# Patient Record
Sex: FEMALE | Race: BLACK OR AFRICAN AMERICAN
[De-identification: names, ages, dates, MRNs, and addresses within clinical notes are randomized per-mention and may not be internally consistent; named-entity substitution may affect disease eponyms.]

---

## 2017-08-20 ENCOUNTER — HOSPITAL ENCOUNTER (EMERGENCY)
Dept: HOSPITAL 10 - FTE | Age: 37
Discharge: HOME | End: 2017-08-20
Payer: COMMERCIAL

## 2017-08-20 VITALS
WEIGHT: 117.95 LBS | HEIGHT: 60 IN | WEIGHT: 117.95 LBS | HEIGHT: 60 IN | BODY MASS INDEX: 23.16 KG/M2 | BODY MASS INDEX: 23.16 KG/M2

## 2017-08-20 DIAGNOSIS — R11.2: ICD-10-CM

## 2017-08-20 DIAGNOSIS — K29.70: Primary | ICD-10-CM

## 2017-08-20 LAB
ADD UMIC: YES
ALBUMIN SERPL-MCNC: 4.4 G/DL (ref 3.3–4.9)
ALBUMIN/GLOB SERPL: 1.41 {RATIO}
ALP SERPL-CCNC: 38 IU/L (ref 42–121)
ALT SERPL-CCNC: 25 IU/L (ref 13–69)
ANION GAP SERPL CALC-SCNC: 14 MMOL/L (ref 8–16)
AST SERPL-CCNC: 17 IU/L (ref 15–46)
BACTERIA #/AREA URNS HPF: (no result) /HPF
BASOPHILS # BLD AUTO: 0.1 10^3/UL (ref 0–0.1)
BASOPHILS NFR BLD: 0.6 % (ref 0–2)
BILIRUB DIRECT SERPL-MCNC: 0 MG/DL (ref 0–0.2)
BILIRUB SERPL-MCNC: 0.5 MG/DL (ref 0.2–1.3)
BUN SERPL-MCNC: 13 MG/DL (ref 7–20)
CALCIUM SERPL-MCNC: 9.8 MG/DL (ref 8.4–10.2)
CHLORIDE SERPL-SCNC: 100 MMOL/L (ref 97–110)
CO2 SERPL-SCNC: 29 MMOL/L (ref 21–31)
COLOR UR: YELLOW
CREAT SERPL-MCNC: 0.92 MG/DL (ref 0.44–1)
EOSINOPHIL # BLD: 0.1 10^3/UL (ref 0–0.5)
EOSINOPHIL NFR BLD: 1 % (ref 0–7)
ERYTHROCYTE [DISTWIDTH] IN BLOOD BY AUTOMATED COUNT: 14.6 % (ref 11.5–14.5)
GLOBULIN SER-MCNC: 3.1 G/DL (ref 1.3–3.2)
GLUCOSE SERPL-MCNC: 97 MG/DL (ref 70–220)
GLUCOSE UR STRIP-MCNC: NEGATIVE MG/DL
HCT VFR BLD CALC: 37.3 % (ref 37–47)
HGB BLD-MCNC: 12.8 G/DL (ref 12–16)
KETONES UR STRIP.AUTO-MCNC: NEGATIVE MG/DL
LYMPHOCYTES # BLD AUTO: 2.7 10^3/UL (ref 0.8–2.9)
LYMPHOCYTES NFR BLD AUTO: 32.1 % (ref 15–51)
MCH RBC QN AUTO: 26.7 PG (ref 29–33)
MCHC RBC AUTO-ENTMCNC: 34.3 G/DL (ref 32–37)
MCV RBC AUTO: 77.7 FL (ref 82–101)
MONOCYTES # BLD: 0.7 10^3/UL (ref 0.3–0.9)
MONOCYTES NFR BLD: 8.8 % (ref 0–11)
NEUTROPHILS NFR BLD AUTO: 57.3 % (ref 39–77)
NITRITE UR QL STRIP.AUTO: NEGATIVE MG/DL
NRBC # BLD MANUAL: 0 10^3/UL (ref 0–0)
NRBC BLD AUTO-RTO: 0 /100WBC (ref 0–0)
PLATELET # BLD: 335 10^3/UL (ref 140–415)
PMV BLD AUTO: 9.5 FL (ref 7.4–10.4)
POTASSIUM SERPL-SCNC: 3.8 MMOL/L (ref 3.5–5.1)
PROT SERPL-MCNC: 7.5 G/DL (ref 6.1–8.1)
RBC # BLD AUTO: 4.8 10^6/UL (ref 4.2–5.4)
RBC # UR AUTO: NEGATIVE MG/DL
SODIUM SERPL-SCNC: 139 MMOL/L (ref 135–144)
SQUAMOUS #/AREA URNS HPF: (no result) /HPF
UR AMORPHOUS CRYSTAL: (no result) /HPF
UR ASCORBIC ACID: 40 MG/DL
UR BILIRUBIN (DIP): NEGATIVE MG/DL
UR CLARITY: (no result)
UR PH (DIP): 7 (ref 5–9)
UR RBC: 3 /HPF (ref 0–5)
UR SPECIFIC GRAVITY (DIP): 1.02 (ref 1–1.03)
UR TOTAL PROTEIN (DIP): NEGATIVE MG/DL
UROBILINOGEN UR STRIP-ACNC: NEGATIVE MG/DL
WBC # BLD AUTO: 8.3 10^3/UL (ref 4.8–10.8)
WBC # UR STRIP: (no result) LEU/UL

## 2017-08-20 PROCEDURE — 80053 COMPREHEN METABOLIC PANEL: CPT

## 2017-08-20 PROCEDURE — 99283 EMERGENCY DEPT VISIT LOW MDM: CPT

## 2017-08-20 PROCEDURE — 36415 COLL VENOUS BLD VENIPUNCTURE: CPT

## 2017-08-20 PROCEDURE — 83690 ASSAY OF LIPASE: CPT

## 2017-08-20 PROCEDURE — 85025 COMPLETE CBC W/AUTO DIFF WBC: CPT

## 2017-08-20 PROCEDURE — 81001 URINALYSIS AUTO W/SCOPE: CPT

## 2017-08-20 NOTE — ERD
ER Documentation


Chief Complaint


Date/Time


DATE: 8/20/17 


TIME: 13:16


Chief Complaint


Complains of abdominal pain x 1 week





HPI


This is a 36-year-old female who is complaining of epigastric pain that she has 

had on and off for 1 week.  She states sometimes she eats it makes it worse but 

sometimes she eats and makes it better.  She admits to episodes of nausea and 

vomiting but she is tolerating oral intake.  She has tried Pepto-Bismol and [place]

[place] without any relief.  She denies any fever.  She denies any lower 

abdominal or pelvic pain.  She denies any dysuria hematuria or urinary 

frequency.  Last menstrual period was approximately 1 week ago.





ROS


All systems reviewed and are negative except as per history of present illness.





Medications


Home Meds


Active Scripts


Ondansetron (Ondansetron Odt) 4 Mg Tab.rapdis, 4 MG PO Q6H Y for NAUSEA AND/OR 

VOMITING, #20 TAB


   Prov:IRA OSORIO PA-C         8/20/17


Famotidine* (Famotidine*) 40 Mg Tablet, 40 MG PO BID, #60 TAB


   Prov:IRA OSORIO PA-C         8/20/17





Allergies


Allergies:  


Coded Allergies:  


     No Known Allergy (Unverified , 8/20/17)





PMhx/Soc


Medical and Surgical Hx:  pt denies Medical Hx, pt denies Surgical Hx


Hx Alcohol Use:  Yes


Hx Substance Use:  No


Hx Tobacco Use:  No


Smoking Status:  Never smoker





FmHx


Family History:  No diabetes





Physical Exam


Vitals





Vital Signs








  Date Time  Temp Pulse Resp B/P Pulse Ox O2 Delivery O2 Flow Rate FiO2


 


8/20/17 12:01 98.4 76 20 104/51 100   








Physical Exam


INITIAL VITAL SIGNS: Reviewed by me


GENERAL: Awake, alert and oriented x 4, well appearing, nontoxic, speaking in 

full sentences. No acute distress


HEAD: Atraumatic


EYES: EOMI. PERRL. 


 


THROAT: No tonilar erythema or edema. No exudates. Uvula midline. No kissing 

tonsils.


NECK: Supple. No masses. Full range of motion.  No meningismus. No midline 

tenderness.


RESPIRATORY: Clear to auscultation bilaterally. Symmetric chest wall rise.  No 

wheezing or rales. No accessory muscle use.  


CV: Regular rate and rhythm. No murmurs, rubs, or gallops.  


ABDOMEN: Soft, non-distended. Nontender. Negative [place]. Negative McBurneys 

point tenderness. No CVA tenderness bilaterally. No guarding. No rebound. 


: Deffered.


Result Diagram:  


8/20/17 1226                                                                   

             8/20/17 1226





Results 24 hrs





 Laboratory Tests








Test


  8/20/17


12:26


 


White Blood Count 8.310^3/ul 


 


Red Blood Count 4.8010^6/ul 


 


Hemoglobin 12.8g/dl 


 


Hematocrit 37.3% 


 


Mean Corpuscular Volume 77.7fl 


 


Mean Corpuscular Hemoglobin 26.7pg 


 


Mean Corpuscular Hemoglobin


Concent 34.3g/dl 


 


 


Red Cell Distribution Width 14.6% 


 


Platelet Count 19516^3/UL 


 


Mean Platelet Volume 9.5fl 


 


Neutrophils % 57.3% 


 


Lymphocytes % 32.1% 


 


Monocytes % 8.8% 


 


Eosinophils % 1.0% 


 


Basophils % 0.6% 


 


Nucleated Red Blood Cells % 0.0/100WBC 


 


Neutrophils # (Manual) 510^3/ul 


 


Lymphocytes # 2.710^3/ul 


 


Monocytes # 0.710^3/ul 


 


Eosinophils # 0.110^3/ul 


 


Basophils # 0.110^3/ul 


 


Nucleated Red Blood Cells # 0.010^3/ul 


 


Urine Color YELLOW 


 


Urine Clarity CLOUDY 


 


Urine pH 7.0 


 


Urine Specific Gravity 1.020 


 


Urine Ketones NEGATIVEmg/dL 


 


Urine Nitrite NEGATIVEmg/dL 


 


Urine Bilirubin NEGATIVEmg/dL 


 


Urine Urobilinogen NEGATIVEmg/dL 


 


Urine Leukocyte Esterase TRACELeu/ul 


 


Urine Microscopic RBC 3/HPF 


 


Urine Microscopic WBC 10/HPF 


 


Urine Squamous Epithelial


Cells FEW/HPF 


 


 


Urine Amorphous Crystals MANY/HPF 


 


Urine Bacteria FEW/HPF 


 


Urine Hemoglobin NEGATIVEmg/dL 


 


Urine Glucose NEGATIVEmg/dL 


 


Urine Total Protein NEGATIVEmg/dl 


 


Sodium Level 139mmol/L 


 


Potassium Level 3.8mmol/L 


 


Chloride Level 100mmol/L 


 


Carbon Dioxide Level 29mmol/L 


 


Anion Gap 14 


 


Blood Urea Nitrogen 13mg/dl 


 


Creatinine 0.92mg/dl 


 


Glucose Level 97mg/dl 


 


Calcium Level 9.8mg/dl 


 


Total Bilirubin 0.5mg/dl 


 


Direct Bilirubin 0.00mg/dl 


 


Indirect Bilirubin 0.5mg/dl 


 


Aspartate Amino Transf


(AST/SGOT) 17IU/L 


 


 


Alanine Aminotransferase


(ALT/SGPT) 25IU/L 


 


 


Alkaline Phosphatase 38IU/L 


 


Total Protein 7.5g/dl 


 


Albumin 4.4g/dl 


 


Globulin 3.10g/dl 


 


Albumin/Globulin Ratio 1.41 


 


Lipase 58U/L 








 Current Medications








 Medications


  (Trade)  Dose


 Ordered  Sig/Rex


 Route


 PRN Reason  Start Time


 Stop Time Status Last Admin


Dose Admin


 


 Miscellaneous


 Medication


  (Gi Cocktail (2))  40 ml  ONCE  STAT


 PO


   8/20/17 12:14


 8/20/17 12:16 DC 8/20/17 12:28


 


 


 [place]/


 Phenobarbital


  (Donnatal)  2 tab  ONCE  STAT


 PO


   8/20/17 12:14


 8/20/17 12:16 DC 8/20/17 12:28


 


 


 Ondansetron HCl


  (Zofran Odt)  4 mg  ONCE  STAT


 ODT


   8/20/17 12:30


 8/20/17 12:31 DC 8/20/17 12:32


 


 


 Ondansetron HCl


  (Zofran Odt)  4 mg  STK-MED ONCE


 ODT


   8/20/17 12:31


 8/20/17 12:32 DC  


 











Procedures/MDM


36-year-old presents with abdominal pain.  Patients is alert, oriented, well 

appearing, and in no distress with normal vital signs. There is no fever, 

tachycardia, or tachypnea.  Laboratory analysis shows no evidence of acute 

emergent abnormality. No evidence of significant leukocytosis suggesting 

systemic infection or severe anemia. No evidence of acute renal or liver failure

, no evidence of severe alkalosis or acidosis. The differential diagnosis 

includes but is not limited to appendicitis, cholelithiasis, cholecystitis, 

pancreatitis, hepatitis, gastritis, peptic ulcer disease, bowel obstruction, 

diverticulitis, renal disease including stones, torsion, AAA, pyelonephritis, 

and others.  Patient had good relief of her symptoms with GI cocktail.  She was 

discharged with Pepcid and Zofran as well as copies of labs she can follow with 

primary care.  Patient counseled regarding my diagnostic impression and care 

plan. Prior to discharge all questions answered. Pt agrees with treatment plan 

and understands strict return precautions. Pt is instructed to follow up with 

primary care provider within 24-48 hours. Precautionary instructions provided 

including instructions to return to the ER if not improving or for any 

worsening or changing symptoms or concerns.





Departure


Diagnosis:  


 Primary Impression:  


 Gastritis


Condition:  Stable


Patient Instructions:  Gastritis (Adult)





Additional Instructions:  


Call your primary care doctor TOMORROW for an appointment during the next 1-2 

days.See the doctor sooner or return here if your condition worsens before your 

appointment time.











IRA OSORIO PA-C Aug 20, 2017 13:19

## 2018-10-22 ENCOUNTER — HOSPITAL ENCOUNTER (EMERGENCY)
Dept: HOSPITAL 91 - FTE | Age: 38
Discharge: HOME | End: 2018-10-22
Payer: COMMERCIAL

## 2018-10-22 ENCOUNTER — HOSPITAL ENCOUNTER (EMERGENCY)
Age: 38
Discharge: HOME | End: 2018-10-22

## 2018-10-22 DIAGNOSIS — R05: Primary | ICD-10-CM

## 2018-10-22 PROCEDURE — 99283 EMERGENCY DEPT VISIT LOW MDM: CPT

## 2018-10-22 PROCEDURE — 71045 X-RAY EXAM CHEST 1 VIEW: CPT

## 2018-10-22 PROCEDURE — 94644 CONT INHLJ TX 1ST HOUR: CPT

## 2018-10-22 RX ADMIN — IPRATROPIUM BROMIDE 1 MG: 0.5 SOLUTION RESPIRATORY (INHALATION) at 13:41

## 2018-10-22 RX ADMIN — ALBUTEROL SULFATE 1 MG: 2.5 SOLUTION RESPIRATORY (INHALATION) at 13:41

## 2019-01-24 ENCOUNTER — HOSPITAL ENCOUNTER (EMERGENCY)
Dept: HOSPITAL 91 - FTE | Age: 39
Discharge: HOME | End: 2019-01-24
Payer: COMMERCIAL

## 2019-01-24 DIAGNOSIS — R11.0: ICD-10-CM

## 2019-01-24 DIAGNOSIS — R10.10: Primary | ICD-10-CM

## 2019-01-24 LAB
ADD MAN DIFF?: NO
ADD UMIC: NO
ALANINE AMINOTRANSFERASE: 21 IU/L (ref 13–69)
ALBUMIN/GLOBULIN RATIO: 1.45
ALBUMIN: 4.8 G/DL (ref 3.3–4.9)
ALKALINE PHOSPHATASE: 45 IU/L (ref 42–121)
ANION GAP: 10 (ref 5–13)
ASPARTATE AMINO TRANSFERASE: 20 IU/L (ref 15–46)
BASOPHIL #: 0.1 10^3/UL (ref 0–0.1)
BASOPHILS %: 0.7 % (ref 0–2)
BILIRUBIN,DIRECT: 0 MG/DL (ref 0–0.2)
BILIRUBIN,TOTAL: 0.3 MG/DL (ref 0.2–1.3)
BLOOD UREA NITROGEN: 12 MG/DL (ref 7–20)
CALCIUM: 10.2 MG/DL (ref 8.4–10.2)
CARBON DIOXIDE: 29 MMOL/L (ref 21–31)
CHLORIDE: 101 MMOL/L (ref 97–110)
CREATININE: 0.84 MG/DL (ref 0.44–1)
EOSINOPHILS #: 0.6 10^3/UL (ref 0–0.5)
EOSINOPHILS %: 5.1 % (ref 0–7)
GLOBULIN: 3.3 G/DL (ref 1.3–3.2)
GLUCOSE: 94 MG/DL (ref 70–220)
HEMATOCRIT: 38.7 % (ref 37–47)
HEMOGLOBIN: 13.3 G/DL (ref 12–16)
IMMATURE GRANS #M: 0.04 10^3/UL (ref 0–0.03)
IMMATURE GRANS % (M): 0.3 % (ref 0–0.43)
LIPASE: 101 U/L (ref 23–300)
LYMPHOCYTES #: 3 10^3/UL (ref 0.8–2.9)
LYMPHOCYTES %: 24.8 % (ref 15–51)
MEAN CORPUSCULAR HEMOGLOBIN: 26.3 PG (ref 29–33)
MEAN CORPUSCULAR HGB CONC: 34.4 G/DL (ref 32–37)
MEAN CORPUSCULAR VOLUME: 76.5 FL (ref 82–101)
MEAN PLATELET VOLUME: 9.7 FL (ref 7.4–10.4)
MONOCYTE #: 0.5 10^3/UL (ref 0.3–0.9)
MONOCYTES %: 4.4 % (ref 0–11)
NEUTROPHIL #: 7.8 10^3/UL (ref 1.6–7.5)
NEUTROPHILS %: 64.7 % (ref 39–77)
NUCLEATED RED BLOOD CELLS #: 0 10^3/UL (ref 0–0)
NUCLEATED RED BLOOD CELLS%: 0 /100WBC (ref 0–0)
PLATELET COUNT: 340 10^3/UL (ref 140–415)
POTASSIUM: 4.6 MMOL/L (ref 3.5–5.1)
RED BLOOD COUNT: 5.06 10^6/UL (ref 4.2–5.4)
RED CELL DISTRIBUTION WIDTH: 14.5 % (ref 11.5–14.5)
SODIUM: 140 MMOL/L (ref 135–144)
TOTAL PROTEIN: 8.1 G/DL (ref 6.1–8.1)
UR AMORPHOUS CRYSTAL: (no result) /HPF
UR ASCORBIC ACID: NEGATIVE MG/DL
UR BILIRUBIN (DIP): NEGATIVE MG/DL
UR BLOOD (DIP): NEGATIVE MG/DL
UR CLARITY: (no result)
UR COLOR: (no result)
UR GLUCOSE (DIP): NEGATIVE MG/DL
UR KETONES (DIP): NEGATIVE MG/DL
UR LEUKOCYTE ESTERASE (DIP): NEGATIVE LEU/UL
UR NITRITE (DIP): NEGATIVE MG/DL
UR PH (DIP): 7 (ref 5–9)
UR RBC: 0 /HPF (ref 0–5)
UR SPECIFIC GRAVITY (DIP): 1.01 (ref 1–1.03)
UR SQUAMOUS EPITHELIAL CELL: (no result) /HPF
UR TOTAL PROTEIN (DIP): NEGATIVE MG/DL
UR UROBILINOGEN (DIP): NEGATIVE MG/DL
UR WBC: 1 /HPF (ref 0–5)
WHITE BLOOD COUNT: 12 10^3/UL (ref 4.8–10.8)

## 2019-01-24 PROCEDURE — 76705 ECHO EXAM OF ABDOMEN: CPT

## 2019-01-24 PROCEDURE — 81003 URINALYSIS AUTO W/O SCOPE: CPT

## 2019-01-24 PROCEDURE — 36415 COLL VENOUS BLD VENIPUNCTURE: CPT

## 2019-01-24 PROCEDURE — 96375 TX/PRO/DX INJ NEW DRUG ADDON: CPT

## 2019-01-24 PROCEDURE — 81001 URINALYSIS AUTO W/SCOPE: CPT

## 2019-01-24 PROCEDURE — 96361 HYDRATE IV INFUSION ADD-ON: CPT

## 2019-01-24 PROCEDURE — 80053 COMPREHEN METABOLIC PANEL: CPT

## 2019-01-24 PROCEDURE — 81025 URINE PREGNANCY TEST: CPT

## 2019-01-24 PROCEDURE — 96374 THER/PROPH/DIAG INJ IV PUSH: CPT

## 2019-01-24 PROCEDURE — 99285 EMERGENCY DEPT VISIT HI MDM: CPT

## 2019-01-24 PROCEDURE — 74176 CT ABD & PELVIS W/O CONTRAST: CPT

## 2019-01-24 PROCEDURE — 83690 ASSAY OF LIPASE: CPT

## 2019-01-24 PROCEDURE — 85025 COMPLETE CBC W/AUTO DIFF WBC: CPT

## 2019-01-24 RX ADMIN — FAMOTIDINE 1 MG: 10 INJECTION, SOLUTION INTRAVENOUS at 15:07

## 2019-01-24 RX ADMIN — FAMOTIDINE 1 MG: 10 INJECTION, SOLUTION INTRAVENOUS at 17:53

## 2019-01-24 RX ADMIN — ACETAMINOPHEN 1 MG: 325 TABLET, FILM COATED ORAL at 17:05

## 2019-01-24 RX ADMIN — THIAMINE HYDROCHLORIDE 1 MLS/HR: 100 INJECTION, SOLUTION INTRAMUSCULAR; INTRAVENOUS at 15:08

## 2019-01-24 RX ADMIN — ONDANSETRON HYDROCHLORIDE 1 MG: 2 INJECTION, SOLUTION INTRAMUSCULAR; INTRAVENOUS at 15:07

## 2019-01-25 ENCOUNTER — HOSPITAL ENCOUNTER (EMERGENCY)
Dept: HOSPITAL 10 - FTE | Age: 39
Discharge: HOME | End: 2019-01-25
Payer: COMMERCIAL

## 2019-01-25 ENCOUNTER — HOSPITAL ENCOUNTER (EMERGENCY)
Dept: HOSPITAL 91 - FTE | Age: 39
Discharge: HOME | End: 2019-01-25
Payer: COMMERCIAL

## 2019-01-25 VITALS — BODY MASS INDEX: 26.53 KG/M2 | WEIGHT: 114.64 LBS | HEIGHT: 55 IN

## 2019-01-25 DIAGNOSIS — R10.9: Primary | ICD-10-CM

## 2019-01-25 LAB
ADD MAN DIFF?: NO
ALANINE AMINOTRANSFERASE: 26 IU/L (ref 13–69)
ALBUMIN/GLOBULIN RATIO: 1.36
ALBUMIN: 4.5 G/DL (ref 3.3–4.9)
ALKALINE PHOSPHATASE: 38 IU/L (ref 42–121)
ANION GAP: 14 (ref 5–13)
ASPARTATE AMINO TRANSFERASE: 18 IU/L (ref 15–46)
BASOPHIL #: 0.1 10^3/UL (ref 0–0.1)
BASOPHILS %: 0.9 % (ref 0–2)
BILIRUBIN,DIRECT: 0 MG/DL (ref 0–0.2)
BILIRUBIN,TOTAL: 0.2 MG/DL (ref 0.2–1.3)
BLOOD UREA NITROGEN: 11 MG/DL (ref 7–20)
CALCIUM: 10.2 MG/DL (ref 8.4–10.2)
CARBON DIOXIDE: 28 MMOL/L (ref 21–31)
CHLORIDE: 100 MMOL/L (ref 97–110)
CREATININE: 0.88 MG/DL (ref 0.44–1)
EOSINOPHILS #: 0.9 10^3/UL (ref 0–0.5)
EOSINOPHILS %: 9.9 % (ref 0–7)
GLOBULIN: 3.3 G/DL (ref 1.3–3.2)
GLUCOSE: 105 MG/DL (ref 70–220)
HEMATOCRIT: 37 % (ref 37–47)
HEMOGLOBIN: 12.5 G/DL (ref 12–16)
IMMATURE GRANS #M: 0.02 10^3/UL (ref 0–0.03)
IMMATURE GRANS % (M): 0.2 % (ref 0–0.43)
LIPASE: 121 U/L (ref 23–300)
LYMPHOCYTES #: 2.8 10^3/UL (ref 0.8–2.9)
LYMPHOCYTES %: 31.8 % (ref 15–51)
MEAN CORPUSCULAR HEMOGLOBIN: 26.1 PG (ref 29–33)
MEAN CORPUSCULAR HGB CONC: 33.8 G/DL (ref 32–37)
MEAN CORPUSCULAR VOLUME: 77.2 FL (ref 82–101)
MEAN PLATELET VOLUME: 9.8 FL (ref 7.4–10.4)
MONOCYTE #: 0.4 10^3/UL (ref 0.3–0.9)
MONOCYTES %: 4.6 % (ref 0–11)
NEUTROPHIL #: 4.5 10^3/UL (ref 1.6–7.5)
NEUTROPHILS %: 52.6 % (ref 39–77)
NUCLEATED RED BLOOD CELLS #: 0 10^3/UL (ref 0–0)
NUCLEATED RED BLOOD CELLS%: 0 /100WBC (ref 0–0)
PLATELET COUNT: 348 10^3/UL (ref 140–415)
POTASSIUM: 4.9 MMOL/L (ref 3.5–5.1)
RED BLOOD COUNT: 4.79 10^6/UL (ref 4.2–5.4)
RED CELL DISTRIBUTION WIDTH: 14.6 % (ref 11.5–14.5)
SODIUM: 142 MMOL/L (ref 135–144)
TOTAL PROTEIN: 7.8 G/DL (ref 6.1–8.1)
WHITE BLOOD COUNT: 8.7 10^3/UL (ref 4.8–10.8)

## 2019-01-25 PROCEDURE — 99283 EMERGENCY DEPT VISIT LOW MDM: CPT

## 2019-01-25 PROCEDURE — 85025 COMPLETE CBC W/AUTO DIFF WBC: CPT

## 2019-01-25 PROCEDURE — 80053 COMPREHEN METABOLIC PANEL: CPT

## 2019-01-25 PROCEDURE — 83690 ASSAY OF LIPASE: CPT

## 2019-01-25 RX ADMIN — ALUMINUM HYDROXIDE, MAGNESIUM HYDROXIDE, DIMETHICONE 1 ML: 200; 200; 20 SUSPENSION ORAL at 14:13

## 2019-01-25 NOTE — ERD
ER Documentation


Chief Complaint


Chief Complaint





ABD PAIN, PT HERE YESTERDAY WITH SAME S/S, STATES NOT GETTING BETTER





HPI


38-year-old female presents for abdominal pain times 2 days.  She was seen 


yesterday for the same symptoms and was told to return to the ED if pain 


continues.  She has had abdominal pain for about a year on and off, she states 


that couple days ago the pain got worse which brought her to the ER.  The pain 


is in the left upper quadrant and epigastric area, rated 10 out of 10, 


intermittent.  She denies any nausea vomiting or diarrhea.  Denies chest pain or


shortness of breath.





She has not picked up the medication that she was prescribed yesterday.  Review 


of record shows that patient was prescibed ranitidine, Zofran.  She had blood 


work, gallbladder ultrasound, CT abdomen pelvis without contrast done yesterday 


and was told that it was unremarkable.





ROS


All systems reviewed and are negative except as per history of present illness.





Medications


Home Meds


Active Scripts


Magaldrate/Simethicone* (Mylanta*) 355 Ml Susp, 30 ML PO QID PRN for 


GASTROINTESTINAL UPSET, #1 BOTTLE


   Prov:LIANANICHOLE          1/25/19


Ondansetron (Ondansetron Odt) 4 Mg Tab.rapdis, 4 MG PO Q6H PRN for NAUSEA AND/OR


VOMITING, #10 TAB


   Prov:LAURIE SALVADOR PA-C         1/24/19


Fluticasone Propionate (Flonase Allergy Relief) 9.9 Ml Arvin.susp, 1 SPRAY NASAL


DAILY, #1 BOTTLE


   TO EACH NOSTRIL


   Prov:LAURIE SALVADOR PA-C         1/24/19


Ranitidine Hcl* (Zantac*) 150 Mg Tablet, 150 MG PO BID PRN for EPIGASTRIC PAIN, 


#30 TAB


   Prov:LAURIE SALVADOR PA-C         1/24/19


Acetaminophen* (Tylophen*) 500 Mg Capsule, 1 CAP PO Q6H PRN for PAIN AND OR 


ELEVATED TEMP, #20 CAP


   Prov:LAURIE SALVADOR PA-C         1/24/19


Prednisone* (Prednisone*) 20 Mg Tab, 40 MG PO DAILY for 4 Days, TAB


   Prov:LAURIE SALVADOR PA-C         10/22/18


Albuterol Sulfate* (Proair HFA*) 8.5 Gm Hfa.aer.ad, 2 PUFF INH Q4, #1 INHALER


   Prov:LAURIE SALVADOR MARNIO MORRISON         10/22/18


Benzonatate* (Tessalon Perle*) 100 Mg Capsule, 100 MG PO Q8H PRN for COUGH, #20 


CAP


   Prov:LAURIE SALVADOR MARINO MORRISON         10/22/18


Ondansetron (Ondansetron Odt) 4 Mg Tab.rapdis, 4 MG PO Q6H PRN for NAUSEA AND/OR


VOMITING, #20 TAB


   Prov:IRA OSORIO PA-C         8/20/17


Famotidine* (Famotidine*) 40 Mg Tablet, 40 MG PO BID, #60 TAB


   Prov:OSORIOIRA PA-C         8/20/17





Allergies


Allergies:  


Coded Allergies:  


     No Known Allergy (Unverified , 1/24/19)





PMhx/Soc


Medical and Surgical Hx:  pt denies Medical Hx, pt denies Surgical Hx


Hx Alcohol Use:  Yes (socially)


Hx Substance Use:  No


Hx Tobacco Use:  No





Physical Exam


Vitals


Vital Signs


  Date      Temp  Pulse  Resp  B/P (MAP)   Pulse Ox  O2          O2 Flow    FiO2


Time                                                 Delivery    Rate


   1/25/19  97.1     61    17      112/62       100


     12:30                           (79)





Physical Exam


Const:   No acute distress


Resp:   Clear to auscultation bilaterally


Cardio:   Regular rate and rhythm, no murmurs


Abd:    Soft, non distended. Normal bowel sounds, there is some tenderness to 


palpation of the left upper quadrant and epigastric area, no McBurney's point 


tenderness, no Jacobs sign, no rebound or guarding noted


Skin:   No petechiae or rashes


Back:   No midline or flank tenderness


Ext:    No cyanosis, or edema


Neur:   Awake and alert


Psych:    Normal Mood and Affect


Results 24 hrs





Laboratory Tests


              Test
                                 1/25/19
14:05


              White Blood Count                      8.7 10^3/ul


              Red Blood Count                       4.79 10^6/ul


              Hemoglobin                               12.5 g/dl


              Hematocrit                                  37.0 %


              Mean Corpuscular Volume                    77.2 fl


              Mean Corpuscular Hemoglobin                26.1 pg


              Mean Corpuscular Hemoglobin
Concent     33.8 g/dl 



              Red Cell Distribution Width                 14.6 %


              Platelet Count                         348 10^3/UL


              Mean Platelet Volume                        9.8 fl


              Immature Granulocytes %                    0.200 %


              Neutrophils %                               52.6 %


              Lymphocytes %                               31.8 %


              Monocytes %                                  4.6 %


              Eosinophils %                                9.9 %


              Basophils %                                  0.9 %


              Nucleated Red Blood Cells %            0.0 /100WBC


              Immature Granulocytes #              0.020 10^3/ul


              Neutrophils #                          4.5 10^3/ul


              Lymphocytes #                          2.8 10^3/ul


              Monocytes #                            0.4 10^3/ul


              Eosinophils #                          0.9 10^3/ul


              Basophils #                            0.1 10^3/ul


              Nucleated Red Blood Cells #            0.0 10^3/ul


              Sodium Level                            142 mmol/L


              Potassium Level                         4.9 mmol/L


              Chloride Level                          100 mmol/L


              Carbon Dioxide Level                     28 mmol/L


              Anion Gap                                       14


              Blood Urea Nitrogen                       11 mg/dl


              Creatinine                              0.88 mg/dl


              Est Glomerular Filtrat Rate
mL/min   > 60 mL/min 



              Glucose Level                            105 mg/dl


              Calcium Level                           10.2 mg/dl


              Total Bilirubin                          0.2 mg/dl


              Direct Bilirubin                        0.00 mg/dl


              Indirect Bilirubin                       0.2 mg/dl


              Aspartate Amino Transf
(AST/SGOT)         18 IU/L 



              Alanine Aminotransferase
(ALT/SGPT)       26 IU/L 



              Alkaline Phosphatase                       38 IU/L


              Total Protein                             7.8 g/dl


              Albumin                                   4.5 g/dl


              Globulin                                 3.30 g/dl


              Albumin/Globulin Ratio                        1.36


              Lipase                                     121 U/L





Current Medications


 Medications
   Dose
          Sig/Rex
       Start Time
   Status  Last


 (Trade)       Ordered        Route
 PRN     Stop Time              Admin
Dose


                              Reason                                Admin


                40 ml          ONCE  STAT
    1/25/19       DC           1/25/19


Miscellaneous                 PO
            13:46
                       14:13




 Medication
                                1/25/19 13:47


 (Gi Cocktail


(2))








Procedures/MDM


Medical Decision Making:





Differential diagnosis includes but not limited to acute gastritis, acute 


gastroenteritis, appendicitis, cholecystitis, pancreatitis.





Patient appeared well on physical exam. Nontoxic appearing.


There was left upper quadrant and epigastric tenderness to palpation.





Labs:


CBC showed no anemia, no elevated WBC to suggest infection


CMP showed no electrolyte abnormalities, there was normal kidney and liver 


function


Lipase was normal 





Urine pregnancy and UA done yesterday was unremarkable.





Imaging:


No imaging was done this visit because patient had gallbladder ultrasound and CT


abdomen and pelvis without contrast yesterday which was unremarkable.





ED course:


Patient was given GI cocktail.


Symptoms improved with treatment.





Prescription(s):


Patient given prescription for Mylanta.





Patient possibly has gastritis.


There is low suspicion for acute abdomen at this point.





Patient advised to follow up with PCP in 1-2 days. Patient advised to return to 


ED for new or worsening symptoms. Patient stable on discharge from the ED.








Disclaimer: Inadvertent spelling and grammatical errors are likely due to 


EHR/dictation software use and do not reflect on the overall quality of patient 


care. Also, please note that the electronic time recorded on this note does not 


necessarily reflect the actual time of the patient encounter.





Departure


Diagnosis:  


   Primary Impression:  


   Abdominal pain


Condition:  Fair


Patient Instructions:  Abdominal Pain


Referrals:  


[facility]


YOU HAVE RECEIVED A MEDICAL SCREENING EXAM AND THE RESULTS INDICATE THAT YOU DO 


NOT HAVE A CONDITION THAT REQUIRES URGENT TREATMENT IN THE EMERGENCY DEPARTMENT.





FURTHER EVALUATION AND TREATMENT OF YOUR CONDITION CAN WAIT UNTIL YOU ARE SEEN 


IN YOUR DOCTORS OFFICE WITHIN THE NEXT 1-2 DAYS. IT IS YOUR RESPONSIBILITY TO 


MAKE AN APPOINTMENT FOR FOLOW-UP CARE.





IF YOU HAVE A PRIMARY DOCTOR


--you should call your primary doctor and schedule an appointment





IF YOU DO NOT HAVE A PRIMARY DOCTOR YOU CAN CALL OUR PHYSICIAN REFERRAL HOTLINE 


AT


 [phone] 





IF YOU CAN NOT AFFORD TO SEE A PHYSICIAN YOU CAN CHOSE FROM THE FOLLOWING 


[facility]





[facility] [phone] [address] [facility]. [facility] [phone] [address] [facility]. [facility] [phone]


[address] [facility] [phone] [address] [facility]. [facility] [phone] [address] [facility]. [facility]. [phone]


[address]





Additional Instructions:  


Call your primary care doctor TOMORROW for an appointment during the next 1-2 


days.See the doctor sooner or return here if your condition worsens before your 


appointment time.





Follow up with gastroenterology.











NICHOLE GAINES DO                 Jan 25, 2019 18:30

## 2019-04-26 ENCOUNTER — HOSPITAL ENCOUNTER (EMERGENCY)
Dept: HOSPITAL 10 - FTE | Age: 39
Discharge: HOME | End: 2019-04-26
Payer: COMMERCIAL

## 2019-04-26 ENCOUNTER — HOSPITAL ENCOUNTER (EMERGENCY)
Dept: HOSPITAL 91 - FTE | Age: 39
Discharge: HOME | End: 2019-04-26
Payer: COMMERCIAL

## 2019-04-26 VITALS — DIASTOLIC BLOOD PRESSURE: 75 MMHG | RESPIRATION RATE: 18 BRPM | HEART RATE: 59 BPM | SYSTOLIC BLOOD PRESSURE: 114 MMHG

## 2019-04-26 VITALS — WEIGHT: 116.18 LBS | BODY MASS INDEX: 26.89 KG/M2 | HEIGHT: 55 IN

## 2019-04-26 DIAGNOSIS — S33.5XXA: Primary | ICD-10-CM

## 2019-04-26 DIAGNOSIS — S13.9XXA: ICD-10-CM
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
## 2019-04-26 LAB — URINE PH (DIP) POC: 7 (ref 5–8.5)

## 2019-04-26 PROCEDURE — 81025 URINE PREGNANCY TEST: CPT

## 2019-04-26 PROCEDURE — 99283 EMERGENCY DEPT VISIT LOW MDM: CPT

## 2019-04-26 PROCEDURE — 81003 URINALYSIS AUTO W/O SCOPE: CPT

## 2019-04-26 PROCEDURE — 72100 X-RAY EXAM L-S SPINE 2/3 VWS: CPT

## 2019-04-26 PROCEDURE — 72040 X-RAY EXAM NECK SPINE 2-3 VW: CPT

## 2019-04-26 RX ADMIN — ACETAMINOPHEN 1 MG: 325 TABLET, FILM COATED ORAL at 15:48
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
